# Patient Record
Sex: MALE
[De-identification: names, ages, dates, MRNs, and addresses within clinical notes are randomized per-mention and may not be internally consistent; named-entity substitution may affect disease eponyms.]

---

## 2022-01-04 PROBLEM — Z00.00 ENCOUNTER FOR PREVENTIVE HEALTH EXAMINATION: Status: ACTIVE | Noted: 2022-01-04

## 2022-01-05 ENCOUNTER — APPOINTMENT (OUTPATIENT)
Age: 62
End: 2022-01-05
Payer: COMMERCIAL

## 2022-01-05 DIAGNOSIS — G47.33 OBSTRUCTIVE SLEEP APNEA (ADULT) (PEDIATRIC): ICD-10-CM

## 2022-01-05 PROCEDURE — 99442: CPT | Mod: 95

## 2022-01-05 NOTE — ASSESSMENT
[FreeTextEntry1] : Assessment:\par NAHUM\par Obesity\par \par PLAN:\par The patient is benefitting from the PAP device .\par New supplies ordered \par Weight loss discussed\par I stressed the need maintain compliance  with the PAP device.\par The patient is not to use an Ozone or UV sterilizer. \par F/U in 12 months\par \par The patient's PAP unit is  under recall. The patient has registered the PAP device.\par I discussed at length with the pt the pros and cons to continuing PAP device given the health risks compared to stopping the device. There can be extreme risks to stopping the PAP.\par The patient is not to use an Ozone or UV sterilizer.\par The pt will call the insurance then vendor to see next steps in obtaining a replacement PAP device.\par I will forward a Rx to assist with the transition to new PAP device.\par I discussed the proposed solutions from [employer] regarding the CPAP replacement.\par \par \par total phone call  time 14 min\par

## 2022-01-05 NOTE — HISTORY OF PRESENT ILLNESS
[TextBox_4] : I reviewed all the previous sleep test that are available on file.\par I reviewed my previous office notes.\par \par

## 2022-04-28 ENCOUNTER — APPOINTMENT (OUTPATIENT)
Age: 62
End: 2022-04-28